# Patient Record
Sex: FEMALE | Race: WHITE | HISPANIC OR LATINO | Employment: UNEMPLOYED | URBAN - METROPOLITAN AREA
[De-identification: names, ages, dates, MRNs, and addresses within clinical notes are randomized per-mention and may not be internally consistent; named-entity substitution may affect disease eponyms.]

---

## 2017-01-10 ENCOUNTER — APPOINTMENT (OUTPATIENT)
Dept: RADIOLOGY | Facility: IMAGING CENTER | Age: 27
End: 2017-01-10
Attending: NURSE PRACTITIONER
Payer: MEDICAID

## 2017-01-10 ENCOUNTER — ROUTINE PRENATAL (OUTPATIENT)
Dept: OBGYN | Facility: CLINIC | Age: 27
End: 2017-01-10
Payer: MEDICAID

## 2017-01-10 ENCOUNTER — HOSPITAL ENCOUNTER (OUTPATIENT)
Dept: LAB | Facility: MEDICAL CENTER | Age: 27
End: 2017-01-10
Attending: NURSE PRACTITIONER
Payer: COMMERCIAL

## 2017-01-10 VITALS — BODY MASS INDEX: 31.57 KG/M2 | DIASTOLIC BLOOD PRESSURE: 88 MMHG | WEIGHT: 184 LBS | SYSTOLIC BLOOD PRESSURE: 112 MMHG

## 2017-01-10 DIAGNOSIS — Z34.90 ENCOUNTER FOR SUPERVISION OF NORMAL PREGNANCY, ANTEPARTUM, UNSPECIFIED GRAVIDITY: ICD-10-CM

## 2017-01-10 DIAGNOSIS — D25.9 UTERINE LEIOMYOMA, UNSPECIFIED LOCATION: ICD-10-CM

## 2017-01-10 DIAGNOSIS — O36.8131 DECREASED FETAL MOVEMENT DURING PREGNANCY IN THIRD TRIMESTER, ANTEPARTUM, FETUS 1: ICD-10-CM

## 2017-01-10 LAB
ABO GROUP BLD: NORMAL
APPEARANCE UR: ABNORMAL
BACTERIA #/AREA URNS HPF: ABNORMAL /HPF
BASOPHILS # BLD AUTO: 0.05 K/UL (ref 0–0.12)
BASOPHILS NFR BLD AUTO: 0.4 % (ref 0–1.8)
BILIRUB UR QL STRIP.AUTO: NEGATIVE
BLD GP AB SCN SERPL QL: NORMAL
COLOR UR AUTO: ABNORMAL
CULTURE IF INDICATED INDCX: YES UA CULTURE
EOSINOPHIL # BLD: 0.04 K/UL (ref 0–0.51)
EOSINOPHIL NFR BLD AUTO: 0.3 % (ref 0–6.9)
EPITHELIAL CELLS 1715: ABNORMAL /HPF
ERYTHROCYTE [DISTWIDTH] IN BLOOD BY AUTOMATED COUNT: 41.1 FL (ref 35.9–50)
GLUCOSE UR STRIP.AUTO-MCNC: NEGATIVE MG/DL
HBV SURFACE AG SERPL QL IA: NEGATIVE
HCT VFR BLD AUTO: 40.1 % (ref 37–47)
HGB BLD-MCNC: 13 G/DL (ref 12–16)
HIV 1+2 AB+HIV1 P24 AG SERPL QL IA: NON REACTIVE
IMM GRANULOCYTES # BLD AUTO: 0.05 K/UL (ref 0–0.11)
IMM GRANULOCYTES NFR BLD AUTO: 0.4 % (ref 0–0.9)
KETONES UR STRIP.AUTO-MCNC: NEGATIVE MG/DL
LEUKOCYTE ESTERASE UR QL STRIP.AUTO: ABNORMAL
LYMPHOCYTES # BLD: 1.6 K/UL (ref 1–4.8)
LYMPHOCYTES NFR BLD AUTO: 13.9 % (ref 22–41)
MCH RBC QN AUTO: 27.8 PG (ref 27–33)
MCHC RBC AUTO-ENTMCNC: 32.4 G/DL (ref 33.6–35)
MCV RBC AUTO: 85.9 FL (ref 81.4–97.8)
MICRO URNS: ABNORMAL
MONOCYTES # BLD: 0.61 K/UL (ref 0–0.85)
MONOCYTES NFR BLD AUTO: 5.3 % (ref 0–13.4)
MUCOUS THREADS URNS QL MICRO: ABNORMAL /HPF
NEUTROPHILS # BLD: 9.19 K/UL (ref 2–7.15)
NEUTROPHILS NFR BLD AUTO: 79.7 % (ref 44–72)
NITRITE UR QL STRIP.AUTO: NEGATIVE
NRBC # BLD AUTO: 0 K/UL
NRBC BLD-RTO: 0 /100 WBC
NST ACOUSTIC STIMULATION: NORMAL
NST ACTION NECESSARY: NORMAL
NST ASSESSMENT: NORMAL
NST BASELINE: NORMAL
NST INDICATIONS: NORMAL
NST OTHER DATA: NORMAL
NST READ BY: NORMAL
NST RETURN: NORMAL
NST UTERINE ACTIVITY: NORMAL
PH UR: 6 [PH]
PLATELET # BLD AUTO: 270 K/UL (ref 164–446)
PMV BLD AUTO: 12.2 FL (ref 9–12.9)
PROT UR QL STRIP: NEGATIVE MG/DL
RBC # BLD AUTO: 4.67 M/UL (ref 4.2–5.4)
RBC #/AREA URNS HPF: ABNORMAL /HPF
RBC UR QL AUTO: NEGATIVE
RH BLD: NORMAL
RUBV IGG SERPL IA-ACNC: >500 IU/ML
SP GR UR STRIP.AUTO: 1.01
TREPONEMA PALLIDUM IGG+IGM AB [PRESENCE] IN SERUM OR PLASMA BY IMMUNOASSAY: NON REACTIVE
WBC # BLD AUTO: 11.5 K/UL (ref 4.8–10.8)
WBC #/AREA URNS HPF: ABNORMAL /HPF

## 2017-01-10 PROCEDURE — 90040 PR PRENATAL FOLLOW UP: CPT | Performed by: PHYSICIAN ASSISTANT

## 2017-01-10 PROCEDURE — 76805 OB US >/= 14 WKS SNGL FETUS: CPT | Performed by: OBSTETRICS & GYNECOLOGY

## 2017-01-10 PROCEDURE — 59025 FETAL NON-STRESS TEST: CPT | Performed by: PHYSICIAN ASSISTANT

## 2017-01-10 NOTE — PROGRESS NOTES
"Pt has no complaints with cramping, UCs, VB, LOF. +FM but \"not meeting FKC today.\" GC/CT, GBS neg - pt notified of results. US results wnl, pt notified of anterior uterine fibroid, but I explained through  that pt does NOT need a primary C/S for this reason alone, though she was told she needed one by her doctors in [place]. Pt is refusing to do labs as well, stating she has had problems with the weather, but was here at 830 this morning for her US and did not do labs then. Pt told to go to lab straight after appt today for PNL alone, and can do 1hr GTT before her next appt in 1 wk. Labor precautions reviewed. Daily FKC recommended, though pt states baby hasnt met movements yet today. Will do NST. Vtx confirmed by US today. RTC 1 wk or sooner rpn.   "

## 2017-01-10 NOTE — PROGRESS NOTES
Pt here today for OB follow up  Pt states having pain in pelvic region, and has hemmroids.   Reports decreased FM  WT:184lb  BP: 112/88  Good # [phone]  Pt states due to weather hasn't been able to do Labs.   Pt had u/s today.

## 2017-01-10 NOTE — MR AVS SNAPSHOT
Chemo Amaral   1/10/2017 2:15 PM   Routine Prenatal   MRN: [id number]    [facility]   Dept Phone:  [phone]    Description:  Female : 1990   Provider:  BLANCA Ceballos           Allergies as of 1/10/2017     No Known Allergies      You were diagnosed with     Uterine leiomyoma, unspecified location   [[id number]]         Vital Signs     Blood Pressure Weight Last Menstrual Period Smoking Status          112/88 mmHg 83.462 kg (184 lb) 2016 Never Smoker         Basic Information     Date Of Birth Sex Race Ethnicity Preferred Language    1990 Female Unable to Obtain  Origin (S[place],Trinidadian,Cameroonian,Femi, etc) [place]      Problem List              ICD-10-CM Priority Class Noted - Resolved    Encounter for supervision of normal first pregnancy in third trimester Z34.03   2016 - Present    Fibroid, uterine - anterior, measuring 5.6 x 3.9 x 5.0 cm D25.9   1/10/2017 - Present      Health Maintenance        Date Due Completion Dates    IMM HEP B VACCINE (1 of 3 - Primary Series) 1989 ---    IMM HEP A VACCINE (1 of 2 - Standard Series) 1990 ---    IMM VARICELLA (CHICKENPOX) VACCINE (1 of 2 - 2 Dose Adolescent Series) 2002 ---    IMM DTaP/Tdap/Td Vaccine (1 - Tdap) 2008 ---    PAP SMEAR 2010 ---    IMM INFLUENZA (1) 2016 ---            Current Immunizations     No immunizations on file.      Below and/or attached are the medications your provider expects you to take. Review all of your home medications and newly ordered medications with your provider and/or pharmacist. Follow medication instructions as directed by your provider and/or pharmacist. Please keep your medication list with you and share with your provider. Update the information when medications are discontinued, doses are changed, or new medications (including over-the-counter products) are added; and carry medication information at all times in the event of  emergency situations     Allergies:  No Known Allergies          Medications  Valid as of: January 10, 2017 -  3:13 PM    Generic Name Brand Name Tablet Size Instructions for use    .                 Medicines prescribed today were sent to:     None      Medication refill instructions:       If your prescription bottle indicates you have medication refills left, it is not necessary to call your provider’s office. Please contact your pharmacy and they will refill your medication.    If your prescription bottle indicates you do not have any refills left, you may request refills at any time through one of the following ways: The online [employer] system (except Urgent Care), by calling your provider’s office, or by asking your pharmacy to contact your provider’s office with a refill request. Medication refills are processed only during regular business hours and may not be available until the next business day. Your provider may request additional information or to have a follow-up visit with you prior to refilling your medication.   *Please Note: Medication refills are assigned a new Rx number when refilled electronically. Your pharmacy may indicate that no refills were authorized even though a new prescription for the same medication is available at the pharmacy. Please request the medicine by name with the pharmacy before contacting your provider for a refill.           [employer] Status: Patient Declined

## 2017-01-12 LAB
BACTERIA UR CULT: NORMAL
SIGNIFICANT IND 70042: NORMAL
SOURCE SOURCE: NORMAL

## 2017-01-18 ENCOUNTER — ROUTINE PRENATAL (OUTPATIENT)
Dept: OBGYN | Facility: CLINIC | Age: 27
End: 2017-01-18
Payer: MEDICAID

## 2017-01-18 VITALS — SYSTOLIC BLOOD PRESSURE: 120 MMHG | BODY MASS INDEX: 31.4 KG/M2 | DIASTOLIC BLOOD PRESSURE: 88 MMHG | WEIGHT: 183 LBS

## 2017-01-18 DIAGNOSIS — Z34.03 ENCOUNTER FOR SUPERVISION OF NORMAL FIRST PREGNANCY IN THIRD TRIMESTER: Primary | ICD-10-CM

## 2017-01-18 DIAGNOSIS — D25.9 UTERINE LEIOMYOMA, UNSPECIFIED LOCATION: ICD-10-CM

## 2017-01-18 PROCEDURE — 90040 PR PRENATAL FOLLOW UP: CPT | Performed by: PHYSICIAN ASSISTANT

## 2017-01-18 NOTE — MR AVS SNAPSHOT
Chemo Amaral   2017 1:30 PM   Routine Prenatal   MRN: [id number]    [facility]   Dept Phone:  [phone]    Description:  Female : 1990   Provider:  BLANCA Ceballos           Allergies as of 2017     No Known Allergies      You were diagnosed with     Encounter for supervision of normal first pregnancy in third trimester   [[id number]]  -  Primary     Uterine leiomyoma, unspecified location   [[id number]]         Vital Signs     Blood Pressure Weight Last Menstrual Period Smoking Status          120/88 mmHg 83.008 kg (183 lb) 2016 Never Smoker         Basic Information     Date Of Birth Sex Race Ethnicity Preferred Language    1990 Female Unable to Obtain  Origin (Angolan,Burundian,Japanese,Femi, etc) Angolan      Problem List              ICD-10-CM Priority Class Noted - Resolved    Encounter for supervision of normal first pregnancy in third trimester Z34.03   2016 - Present    Fibroid, uterine - anterior, measuring 5.6 x 3.9 x 5.0 cm D25.9   1/10/2017 - Present      Health Maintenance        Date Due Completion Dates    IMM HEP B VACCINE (1 of 3 - Primary Series) 1989 ---    IMM HEP A VACCINE (1 of 2 - Standard Series) 1990 ---    IMM VARICELLA (CHICKENPOX) VACCINE (1 of 2 - 2 Dose Adolescent Series) 2002 ---    IMM DTaP/Tdap/Td Vaccine (1 - Tdap) 2008 ---    PAP SMEAR 2010 ---    IMM INFLUENZA (1) 2016 ---            Current Immunizations     No immunizations on file.      Below and/or attached are the medications your provider expects you to take. Review all of your home medications and newly ordered medications with your provider and/or pharmacist. Follow medication instructions as directed by your provider and/or pharmacist. Please keep your medication list with you and share with your provider. Update the information when medications are discontinued, doses are changed, or new medications (including  over-the-counter products) are added; and carry medication information at all times in the event of emergency situations     Allergies:  No Known Allergies          Medications  Valid as of: January 18, 2017 -  1:56 PM    Generic Name Brand Name Tablet Size Instructions for use    .                 Medicines prescribed today were sent to:     None      Medication refill instructions:       If your prescription bottle indicates you have medication refills left, it is not necessary to call your provider’s office. Please contact your pharmacy and they will refill your medication.    If your prescription bottle indicates you do not have any refills left, you may request refills at any time through one of the following ways: The online [employer] system (except Urgent Care), by calling your provider’s office, or by asking your pharmacy to contact your provider’s office with a refill request. Medication refills are processed only during regular business hours and may not be available until the next business day. Your provider may request additional information or to have a follow-up visit with you prior to refilling your medication.   *Please Note: Medication refills are assigned a new Rx number when refilled electronically. Your pharmacy may indicate that no refills were authorized even though a new prescription for the same medication is available at the pharmacy. Please request the medicine by name with the pharmacy before contacting your provider for a refill.        Your To Do List     Future Labs/Procedures Complete By Expires    INDUCTION OF LABOR  As directed 1/18/2018         [employer] Status: Patient Declined

## 2017-01-18 NOTE — PROGRESS NOTES
O/B f/u pt. Swollen and hands feet, having abdominal pain, having headaches, blurred vision. Denies other complications. +  Carlo # [phone]

## 2017-01-18 NOTE — PROGRESS NOTES
Pt has no complaints with cramping, UCs, VB, LOF but pt has had incr pressure in low abd, which she thinks is from her fibroid. +FM. GBS neg, PNL wnl - pt notified and urged to do 1hr GTT as well. Labor precautions stressed. Daily FKC and walks recommended. IOL referral sent today for 41wk, though pt keeps saying she wants it to be this week or weekend. I explained it is healthier for her body to go into labor naturallly. Cervix: Cl/75/0, post, soft, vtx. RTC 1 wk or sooner prn.

## 2017-01-23 ENCOUNTER — ROUTINE PRENATAL (OUTPATIENT)
Dept: OBGYN | Facility: CLINIC | Age: 27
End: 2017-01-23
Payer: MEDICAID

## 2017-01-23 VITALS — WEIGHT: 184 LBS | BODY MASS INDEX: 31.57 KG/M2 | SYSTOLIC BLOOD PRESSURE: 110 MMHG | DIASTOLIC BLOOD PRESSURE: 72 MMHG

## 2017-01-23 DIAGNOSIS — Z34.93 NORMAL PREGNANCY, THIRD TRIMESTER: ICD-10-CM

## 2017-01-23 DIAGNOSIS — D25.9 UTERINE LEIOMYOMA, UNSPECIFIED LOCATION: ICD-10-CM

## 2017-01-23 PROCEDURE — 90040 PR PRENATAL FOLLOW UP: CPT | Performed by: NURSE PRACTITIONER

## 2017-01-23 NOTE — PROGRESS NOTES
S) Pt is a 26 y.o.   at 39w6d  gestation. Routine prenatal care today. Had L&D visit to [facility] for edema and decreased fetal movement. Was DC'd home stable.  Reports good  fetal movement. Denies cramping, bleeding or leaking of fluid. Denies dysuria. Generally feels well today. Good self-care activities identified. Denies headaches. Does not wish to take tylenol because she states it makes her baby less.     O) see flow         Filed Vitals:    17 1103   BP: 110/72   Weight: 83.462 kg (184 lb)           Lab:  Recent Results (from the past 336 hour(s))   POCT Fetal Nonstress Test    Collection Time: 01/10/17  2:43 PM   Result Value Ref Range    NST Indications Decr FM     NST Baseline 140bpm     NST Uterine Activity Occ UCs not felt by pt     NST Acoustic Stimulation None     NST Assessment Cat 1, +accels, no decels, mod variability     NST Action Necessary      NST Other Data [facility]     NST Return prn     NST Read By JOSY    PREG CNTR PRENATAL PN    Collection Time: 01/10/17  3:30 PM   Result Value Ref Range    Hepatitis B Surface Antigen Negative Negative    Rubella IgG Antibody >500.0 IU/mL    Syphilis, Treponemal Qual Non Reactive Non Reactive    WBC 11.5 (H) 4.8 - 10.8 K/uL    RBC 4.67 4.20 - 5.40 M/uL    Hemoglobin 13.0 12.0 - 16.0 g/dL    Hematocrit 40.1 37.0 - 47.0 %    MCV 85.9 81.4 - 97.8 fL    MCH 27.8 27.0 - 33.0 pg    MCHC 32.4 (L) 33.6 - 35.0 g/dL    RDW 41.1 35.9 - 50.0 fL    Platelet Count 270 164 - 446 K/uL    MPV 12.2 9.0 - 12.9 fL    Neutrophils-Polys 79.70 (H) 44.00 - 72.00 %    Lymphocytes 13.90 (L) 22.00 - 41.00 %    Monocytes 5.30 0.00 - 13.40 %    Eosinophils 0.30 0.00 - 6.90 %    Basophils 0.40 0.00 - 1.80 %    Immature Granulocytes 0.40 0.00 - 0.90 %    Nucleated RBC 0.00 /100 WBC    Neutrophils (Absolute) 9.19 (H) 2.00 - 7.15 K/uL    Lymphs (Absolute) 1.60 1.00 - 4.80 K/uL    Monos (Absolute) 0.61 0.00 - 0.85 K/uL    Eos (Absolute) 0.04 0.00 - 0.51 K/uL    Baso (Absolute) 0.05 0.00  - 0.12 K/uL    Immature Granulocytes (abs) 0.05 0.00 - 0.11 K/uL    NRBC (Absolute) 0.00 K/uL    Micro Urine Req Microscopic     Color Lt. Yellow     Character Sl Cloudy (A)     Specific Gravity 1.011 <1.035    Ph 6.0 5.0-8.0    Glucose Negative Negative mg/dL    Ketones Negative Negative mg/dL    Protein Negative Negative mg/dL    Bilirubin Negative Negative    Nitrite Negative Negative    Leukocyte Esterase Large (A) Negative    Occult Blood Negative Negative    Culture Indicated Yes UA Culture   HIV ANTIBODIES    Collection Time: 01/10/17  3:30 PM   Result Value Ref Range    HIV Ag/Ab Combo Assay Non Reactive Non Reactive   OP PRENATAL PANEL-BLOOD BANK    Collection Time: 01/10/17  3:30 PM   Result Value Ref Range    ABO Grouping Only A     Rh Grouping Only POS     Antibody Screen Scrn NEG    URINE MICROSCOPIC (W/UA)    Collection Time: 01/10/17  3:30 PM   Result Value Ref Range    WBC 5-10 (A) /hpf    RBC 0-2 /hpf    Bacteria Few (A) None /hpf    Epithelial Cells Few /hpf    Mucous Threads Few /hpf   URINE CULTURE(NEW)    Collection Time: 01/10/17  3:30 PM   Result Value Ref Range    Significant Indicator NEG     Source UR     Urine Culture Mixed skin judson ,000 cfu/mL           Pertinent ultrasound - See fetal survey            A) IUP at 39w6d       S=D         Patient Active Problem List    Diagnosis Date Noted   • Fibroid, uterine - anterior, measuring 5.6 x 3.9 x 5.0 cm 01/10/2017   • Encounter for supervision of normal first pregnancy in third trimester 12/29/2016     P) s/s labor vs general discomforts. Fetal movements reviewed. General ed and anticipatory guidance. Nutrition/exercise/vitamin. Plans breast. Continue PNV. Patient refuses glucola testing despite recommendation. IOL instructions explained.

## 2017-01-23 NOTE — MR AVS SNAPSHOT
Chemo Amaral   2017 10:30 AM   Routine Prenatal   MRN: [id number]    [facility]   Dept Phone:  [phone]    Description:  Female : 1990   Provider:  Ami Nam D.N.P.           Allergies as of 2017     No Known Allergies      You were diagnosed with     Uterine leiomyoma, unspecified location   [[id number]]       Normal pregnancy, third trimester   [[id number]]         Vital Signs     Blood Pressure Weight Last Menstrual Period Smoking Status          110/72 mmHg 83.462 kg (184 lb) 2016 Never Smoker         Basic Information     Date Of Birth Sex Race Ethnicity Preferred Language    1990 Female Unable to Obtain  Origin (Montenegrin,Martiniquais,Indian,Femi, etc) [place]enegrin      Your appointments     2017  6:00 AM   [facility] INDUCTION OF LABOR with NON-SURGICAL L&D   LABOR & DELIVERY [facility] (--)    [address] [place] [id number]   [phone]              Problem List              ICD-10-CM Priority Class Noted - Resolved    Encounter for supervision of normal first pregnancy in third trimester Z34.03   2016 - Present    Fibroid, uterine - anterior, measuring 5.6 x 3.9 x 5.0 cm D25.9   1/10/2017 - Present      Health Maintenance        Date Due Completion Dates    IMM HEP B VACCINE (1 of 3 - Primary Series) 1989 ---    IMM HEP A VACCINE (1 of 2 - Standard Series) 1990 ---    IMM VARICELLA (CHICKENPOX) VACCINE (1 of 2 - 2 Dose Adolescent Series) 2002 ---    IMM DTaP/Tdap/Td Vaccine (1 - Tdap) 2008 ---    PAP SMEAR 2010 ---    IMM INFLUENZA (1) 2016 ---            Current Immunizations     No immunizations on file.      Below and/or attached are the medications your provider expects you to take. Review all of your home medications and newly ordered medications with your provider and/or pharmacist. Follow medication instructions as directed by your provider and/or pharmacist. Please keep your medication list with you  and share with your provider. Update the information when medications are discontinued, doses are changed, or new medications (including over-the-counter products) are added; and carry medication information at all times in the event of emergency situations     Allergies:  No Known Allergies          Medications  Valid as of: January 23, 2017 - 11:46 AM    Generic Name Brand Name Tablet Size Instructions for use    .                 Medicines prescribed today were sent to:     None      Medication refill instructions:       If your prescription bottle indicates you have medication refills left, it is not necessary to call your provider’s office. Please contact your pharmacy and they will refill your medication.    If your prescription bottle indicates you do not have any refills left, you may request refills at any time through one of the following ways: The online [employer] system (except Urgent Care), by calling your provider’s office, or by asking your pharmacy to contact your provider’s office with a refill request. Medication refills are processed only during regular business hours and may not be available until the next business day. Your provider may request additional information or to have a follow-up visit with you prior to refilling your medication.   *Please Note: Medication refills are assigned a new Rx number when refilled electronically. Your pharmacy may indicate that no refills were authorized even though a new prescription for the same medication is available at the pharmacy. Please request the medicine by name with the pharmacy before contacting your provider for a refill.           [employer] Status: Patient Declined

## 2017-01-23 NOTE — PROGRESS NOTES
Pt here today for OB follow up  Pt states having headaches, blurred vision.   Reports +FM  WT:184lb  BP:110/72  Good # [phone]  Pt states she is not going to do 1 hr gtt, will have pt sign refusal.   Pt seen at [facility], states had high bp and was swollen.   IOL scheduled for 1/29/17 at 6:00 am.